# Patient Record
Sex: FEMALE | Race: AMERICAN INDIAN OR ALASKA NATIVE
[De-identification: names, ages, dates, MRNs, and addresses within clinical notes are randomized per-mention and may not be internally consistent; named-entity substitution may affect disease eponyms.]

---

## 2020-06-21 ENCOUNTER — HOSPITAL ENCOUNTER (EMERGENCY)
Dept: HOSPITAL 5 - ED | Age: 40
Discharge: LEFT BEFORE DISCHARGE | End: 2020-06-21
Payer: SELF-PAY

## 2020-06-21 VITALS — DIASTOLIC BLOOD PRESSURE: 83 MMHG | SYSTOLIC BLOOD PRESSURE: 112 MMHG

## 2020-06-21 DIAGNOSIS — Z53.21: ICD-10-CM

## 2020-06-21 DIAGNOSIS — R42: Primary | ICD-10-CM

## 2020-06-21 NOTE — EMERGENCY DEPARTMENT REPORT
Chief Complaint: Dizziness


Stated Complaint: LIGHT HEAD


Time Seen by Provider: 06/21/20 12:12





- HPI


History of Present Illness: 





This is a pleasant healthy 40-year-old female who was recently diagnosed with 

coronavirus COVID-19 2 days ago.  Her symptoms began 7 days ago on June 14.  She

had fever cough loss of taste.  She now has lightheadedness and diarrhea.  She 

denies any pain.  Denies shortness of breath.  Vital signs reviewed were normal.

 She does not have acute emergent condition which needs further stabilization.  

She appears well.  Pulse ox is 98 on room air.  She understands return 

precautions.  Medical screening exam performed and completed.  She has been 

treating at home with Jayleen.





- Exam


Vital Signs: 


                                   Vital Signs











  06/21/20 06/21/20 06/21/20





  12:07 12:12 12:17


 


Temperature 98.4 F 98.4 F 98.4 F


 


Pulse Rate 100 H 100 H 108 H


 


Respiratory 18 18 18





Rate   


 


Blood Pressure  142/93 


 


Blood Pressure 142/93  112/83





[Right]   


 


O2 Sat by Pulse 98 98 100





Oximetry   











MSE screening note: 


Focused history and physical exam performed.


Due to findings the following was ordered:











ED Disposition for MSE


Clinical Impression: 


 COVID-19 virus infection





Disposition: Z-07 MED SCREENING EXAM-LEFT


Is pt being admited?: No


Does the pt Need Aspirin: No


Condition: Stable


Instructions:  COVID-19


Additional Instructions: 


Please self isolate self quarantine until June 28.


Forms:  Work/School Release Form(ED)

## 2020-06-21 NOTE — EVENT NOTE
ED Screening Note


ED Screening Note: 





she states she tested positive for COVID-19 on 6/19/2020


+diarrhea for two days 


states she feels slightly lightheaded this morning


no dizziness, no room spinning


She denies any fever


no SOB


no CP


no n/v


no recent travel 


PMHx none 


no allergies to meds


LNMP: 6/14/2020





This initial assessment/diagnostic orders/clinical plan/treatment(s) is/are 

subject to change based on patients health status, clinical progression and re-

assessment by fellow clinical providers in the ED. Further treatment and workup 

at subsequent clinical providers discretion. Patient/guardian urged not to elope

from the ED as their condition may be serious if not clinically assessed and 

managed. 





Initial orders include: 


labs, CXR